# Patient Record
(demographics unavailable — no encounter records)

---

## 2025-06-11 NOTE — HISTORY OF PRESENT ILLNESS
[FreeTextEntry1] :  Subjective:   - Summary: Patient presents with thick and elongated toenails, dystrophic nails, generalized bilateral foot pain, and calluses under the 2nd and 4th toes bilaterally.   - Chief Complaint (CC): Thick and elongated toenails, dystrophic nails, and generalized bilateral foot pain   - History of Present Illness (HPI): Patient reports thickened and elongated , painful, onychomycotic, onychogryphotic, dystrophic toenails times 10. The patient also complains of generalized bilateral foot pain and calluses under the 2nd and 4th toes bilaterally.    Objective:   - Diagnostic Results:    - Vital Signs:    - Physical Examination (PE): DP is 2/4 bilaterally, PP is 2/4 bilaterally. Capillary return is 2 seconds for all 10 toes. There are IPKs (intractable plantar keratoses) sub the second and fourth metatarsal heads bilaterally, measuring 2x3 cm and 1.5x2 cm respectively. Neurovascular status is intact bilaterally. Skin is well hydrated with good turgor. Thick and elongated toenails, dystrophic in appearance, affecting all 10 toes. There is generalized bilateral foot pain.   Assessment:   - Summary: Patient presents with onychomycosis, onychogryphosis, and bilateral foot pain, complicated by intractable plantar keratoses under the 2nd and 4th metatarsal heads bilaterally.   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   - Plan: -Exam.     - Aseptic mechanical debridement of the elongated, hypertrophic, onychomycotic, onychogryphotic,painful,  dystrophic toenails on all 10 toes.     - Aseptic debridement of IPKs under 2nd and 4th metatarsal heads bilaterally followed by verruca freeze, salinocaine, and dispersive pads bilaterally.         - Follow-up appointment scheduled in two months.

## 2025-06-11 NOTE — PROCEDURE
[FreeTextEntry1] :  Aseptic mechanical debridement of thickened, elongated, dystrophic, painful mycotic toenails times ten.  Aseptic debridement of IPK's sub second and fourth metatarsal heads bilaterally with verruca freeze, salinocaine, and dispersive pads applied bilaterally.